# Patient Record
Sex: MALE | Race: OTHER | NOT HISPANIC OR LATINO
[De-identification: names, ages, dates, MRNs, and addresses within clinical notes are randomized per-mention and may not be internally consistent; named-entity substitution may affect disease eponyms.]

---

## 2022-03-22 PROBLEM — Z00.00 ENCOUNTER FOR PREVENTIVE HEALTH EXAMINATION: Status: ACTIVE | Noted: 2022-03-22

## 2022-03-24 ENCOUNTER — APPOINTMENT (OUTPATIENT)
Dept: VASCULAR SURGERY | Facility: CLINIC | Age: 77
End: 2022-03-24
Payer: MEDICARE

## 2022-03-24 DIAGNOSIS — Z78.9 OTHER SPECIFIED HEALTH STATUS: ICD-10-CM

## 2022-03-24 DIAGNOSIS — Z86.79 PERSONAL HISTORY OF OTHER DISEASES OF THE CIRCULATORY SYSTEM: ICD-10-CM

## 2022-03-24 DIAGNOSIS — R60.0 LOCALIZED EDEMA: ICD-10-CM

## 2022-03-24 PROCEDURE — 99203 OFFICE O/P NEW LOW 30 MIN: CPT

## 2022-03-24 PROCEDURE — 93971 EXTREMITY STUDY: CPT

## 2022-03-25 PROBLEM — Z78.9 SOCIAL ALCOHOL USE: Status: ACTIVE | Noted: 2022-03-25

## 2022-03-25 PROBLEM — Z86.79 HISTORY OF HYPERTENSION: Status: RESOLVED | Noted: 2022-03-25 | Resolved: 2022-03-25

## 2022-03-25 PROBLEM — R60.0 LOWER LEG EDEMA: Status: ACTIVE | Noted: 2022-03-25

## 2022-03-25 PROBLEM — Z78.9 NON-SMOKER: Status: ACTIVE | Noted: 2022-03-25

## 2022-03-25 RX ORDER — ENALAPRIL MALEATE 20 MG/1
20 TABLET ORAL
Refills: 0 | Status: ACTIVE | COMMUNITY

## 2022-03-25 RX ORDER — PNV NO.95/FERROUS FUM/FOLIC AC 28MG-0.8MG
TABLET ORAL
Refills: 0 | Status: ACTIVE | COMMUNITY

## 2022-03-25 RX ORDER — DULOXETINE HYDROCHLORIDE 30 MG/1
30 CAPSULE, DELAYED RELEASE PELLETS ORAL
Refills: 0 | Status: ACTIVE | COMMUNITY

## 2022-03-25 NOTE — ASSESSMENT
[FreeTextEntry1] : 75 yo M with PMHx of HTN referred by Dr. Beltrán for an evaluation of RLE edema that patient has been experiencing for few years. Patient states that he noticed right leg swelling few years ago, he had few US done previously at [facility] and there were no evidence of DVT or venous insufficiency. He denies previous trauma, hx of DVT. \par RLE with moderate edema from knee down, varicose veins noted over medial thigh , knee and calf, palpable peripheral pulses.\par RLE venous doppler was done in the office demonstrating no evidence of DVT, no reflux, gross varicosities noted\par Patient was explained that no intervention is needed at this time.\par He was recommended to wear compression stockings to control the swelling, Rx was provided.\par He may f/u here as needed.

## 2022-03-25 NOTE — PROCEDURE
[FreeTextEntry1] : RLE venous doppler was done in the office demonstrating no evidence of DVT, no reflux, gross varicosities noted

## 2022-03-25 NOTE — REVIEW OF SYSTEMS
[As noted in HPI] : as noted in HPI [Leg Claudication] : no intermittent leg claudication [Lower Ext Edema] : lower extremity edema [As Noted in HPI] : as noted in HPI [Limb Pain] : no limb pain [Limb Swelling] : limb swelling [Negative] : Heme/Lymph

## 2022-03-25 NOTE — PHYSICAL EXAM
[2+] : left 2+ [Ankle Swelling (On Exam)] : present [Ankle Swelling On The Left] : moderate [Varicose Veins Of Lower Extremities] : not present [] : not present [Abdomen Tenderness] : ~T ~M No abdominal tenderness [No Rash or Lesion] : No rash or lesion [Calm] : calm [de-identified] : WN/WD, NAD [de-identified] : NC/AT [de-identified] : supple [de-identified] : RLE : + large bulging varicose veins, moderate edema [de-identified] : +FROM [de-identified] : grossly intact

## 2022-03-25 NOTE — ADDENDUM
[FreeTextEntry1] : I, Dr.Khalil Rodgers, personally performed the evaluation and management (E/M) services for this new patient.  That E/M includes conducting the initial examination, assessing all conditions, and establishing the plan of care.  Today, my ACP, ADONIS Meléndez, was here to observe my evaluation and management services for this patient to be followed going forward.\par \par \par

## 2022-03-25 NOTE — HISTORY OF PRESENT ILLNESS
[FreeTextEntry1] : 77 yo M with PMHx of HTN referred by Dr. Beltrán for an evaluation of RLE edema that patient has been experiencing for few years. Patient states that he noticed right leg swelling few years ago, he had few US done previously at [facility] and there were no evidence of DVT or venous insufficiency. He denies previous trauma, hx of DVT. He ambulates without difficulty, denies claudication, rest pain, skin changes.

## 2023-05-22 ENCOUNTER — TRANSCRIPTION ENCOUNTER (OUTPATIENT)
Age: 78
End: 2023-05-22

## 2024-05-08 ENCOUNTER — RESULT REVIEW (OUTPATIENT)
Age: 79
End: 2024-05-08

## 2024-05-08 ENCOUNTER — OUTPATIENT (OUTPATIENT)
Dept: OUTPATIENT SERVICES | Facility: HOSPITAL | Age: 79
LOS: 1 days | End: 2024-05-08
Payer: MEDICARE

## 2024-05-08 ENCOUNTER — APPOINTMENT (OUTPATIENT)
Dept: ORTHOPEDIC SURGERY | Facility: CLINIC | Age: 79
End: 2024-05-08
Payer: COMMERCIAL

## 2024-05-08 VITALS
BODY MASS INDEX: 29.35 KG/M2 | SYSTOLIC BLOOD PRESSURE: 165 MMHG | WEIGHT: 205 LBS | OXYGEN SATURATION: 96 % | HEART RATE: 77 BPM | DIASTOLIC BLOOD PRESSURE: 93 MMHG | HEIGHT: 70 IN

## 2024-05-08 DIAGNOSIS — Z78.9 OTHER SPECIFIED HEALTH STATUS: ICD-10-CM

## 2024-05-08 DIAGNOSIS — M16.10 UNILATERAL PRIMARY OSTEOARTHRITIS, UNSPECIFIED HIP: ICD-10-CM

## 2024-05-08 DIAGNOSIS — Z86.69 PERSONAL HISTORY OF OTHER DISEASES OF THE NERVOUS SYSTEM AND SENSE ORGANS: ICD-10-CM

## 2024-05-08 DIAGNOSIS — M54.16 RADICULOPATHY, LUMBAR REGION: ICD-10-CM

## 2024-05-08 PROCEDURE — 73521 X-RAY EXAM HIPS BI 2 VIEWS: CPT | Mod: 26

## 2024-05-08 PROCEDURE — 73564 X-RAY EXAM KNEE 4 OR MORE: CPT

## 2024-05-08 PROCEDURE — 72020 X-RAY EXAM OF SPINE 1 VIEW: CPT

## 2024-05-08 PROCEDURE — 72020 X-RAY EXAM OF SPINE 1 VIEW: CPT | Mod: 26

## 2024-05-08 PROCEDURE — 73564 X-RAY EXAM KNEE 4 OR MORE: CPT | Mod: 26,50

## 2024-05-08 PROCEDURE — 73521 X-RAY EXAM HIPS BI 2 VIEWS: CPT

## 2024-05-08 PROCEDURE — 99204 OFFICE O/P NEW MOD 45 MIN: CPT

## 2024-05-08 RX ORDER — ENALAPRIL MALEATE 10 MG/1
10 TABLET ORAL TWICE DAILY
Refills: 0 | Status: ACTIVE | COMMUNITY

## 2024-05-10 PROBLEM — M16.10 ARTHRITIS OF HIP: Status: RESOLVED | Noted: 2024-05-08 | Resolved: 2024-05-10

## 2024-05-10 PROBLEM — Z86.69 HISTORY OF NEUROPATHY: Status: RESOLVED | Noted: 2024-05-08 | Resolved: 2024-05-10

## 2024-05-10 PROBLEM — Z78.9 EXERCISES 5 TO 6 TIMES PER WEEK: Status: ACTIVE | Noted: 2024-05-08

## 2024-05-10 NOTE — DISCUSSION/SUMMARY
[de-identified] : Imp: 78 year old male with radiographically mild and clinically asymptomatic bilateral hip OA, and bilateral lumbar radiculopathy. - Will begin treatment with new course of PT - Continue Tylenol and naproxen as needed - Noncontrast lumbar MRI to evaluate worsening left lumbar radiculopathy - May follow up with current spinal surgeon and may consider instead referral to pain management. Given that symptoms remain well-controlled at this time without significant impact on ADLs, I do not think spinal surgery should be pursued at this time. - I reassured him that the degree of hip OA as seen on today's XRs do not warrant hip replacement at this time - Follow up here as need for new or worsening hip symptoms

## 2024-05-10 NOTE — END OF VISIT
[FreeTextEntry3] : All medical record entries made by the Scribe were at my, Dr. Amado Santiago, direction and personally dictated by me on 05/08/2024. I have reviewed the chart and agree that the record accurately reflects my personal performance of the history, physical exam, assessment and plan. I have also personally directed, reviewed, and agreed with the chart.

## 2024-05-10 NOTE — HISTORY OF PRESENT ILLNESS
[2] : a current pain level of 2/10 [Intermit.] : ~He/She~ states the symptoms seem to be intermittent [Walking] : worsened by walking [Rest] : relieved by rest [de-identified] : 05/08/2024: 78 year old male complaining of left hip pain present for the past 2 months. He localizes pain to the superolateral aspect of the left hip. No proximal or distal radiation. He denies any numbness or paresthesias, and denies any contralateral symptoms. He keeps himself active with activities including water aerobics, and is able to maintain activities through the pain. He does occasionally use a walking cane for support, and reports unlimited ambulatory tolerance. 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

## 2024-05-10 NOTE — PHYSICAL EXAM
[de-identified] : General appearance: well nourished and hydrated, pleasant, alert and oriented x 3, cooperative.   HEENT: normocephalic, EOM intact, wearing mask, external auditory canal clear.   Cardiovascular: no lower leg edema, no varicosities, dorsalis pedis pulses palpable and symmetric.   Lymphatics: no palpable lymphadenopathy, no lymphedema.   Neurologic: sensation is normal, no muscle weakness in upper or lower extremities, patella tendon reflexes present and symmetric.  Right ankle 5/5 dorsiflexion strength. Left ankle and toes 0/5 dorsiflexion strength. Dermatologic: skin moist, warm, no rash.   Spine: cervical spine with normal lordosis and painless range of motion, thoracic spine with normal kyphosis and painless range of motion, lumbosacral spine with normal lordosis and painless range of motion.  No tenderness to palpation along midline spine and paraspinal musculature.  Sacroiliac joints nontender bilaterally. Negative SLR and crossed SLR tests bilaterally. Gait: cautious gait pattern with steppage gait on the left, with no specific antalgia.    Left hip: - Focal soft tissue swelling: none - Ecchymosis: none - Erythema: none - Wounds: none - Tenderness: none - ROM:    - Flexion: 110   - Extension: 0   - Adduction: 5   - Abduction: 60   - Internal rotation in 90 degrees of hip flexion: 10   - External rotation in 90 degrees of hip flexion: 35 - BLAS: negative - FADIR: negative - Dionisio: negative - Stinchfield: negative - Flexor power: 5/5 - Abductor power: 5/5 [de-identified] : A lateral view of the lumbosacral spine, weightbearing AP pelvis, 2 additional views (frog lateral and false profile) of the (left/right) hip, and 4 views of the bilateral knees (weightbearing AP, weightbearing Ross, weightbearing lateral, and Sunrise) were interpreted by me and reviewed with the patient.  Location of imaging: Health system Date of exam: 05/08/2024  Lumbar spine -- Alignment: some exaggerated lumbar lordosis Spondylosis: none Listhesis: mild along L3-L4 and L4-L5 Posterior elements: mild multilevel facet arthrosis  Pelvic alignment: somewhat oblique with right side up  Bilateral hips -- Arthritis: mild osteoarthritis with diffuse joint space narrowing, TONNIS 1 Deformity: none Osteonecrosis: none  Right knee --  Alignment: normal Arthritis: mild-moderate patellofemoral osteoarthritis, Kellgren & Asad 1 Patellar height: normal Patellar tracking: central  Left knee --  Alignment: normal Arthritis: mild-moderate patellofemoral osteoarthritis, Kellgren & Asad 1 Patellar height: normal Patellar tracking: central

## 2024-05-10 NOTE — ADDENDUM
[FreeTextEntry1] : I, Raza Mancuso, documented this note as a scribe on behalf of Dr. Amado Santiago on 05/08/2024.

## 2024-05-13 ENCOUNTER — NON-APPOINTMENT (OUTPATIENT)
Age: 79
End: 2024-05-13